# Patient Record
Sex: FEMALE | Race: WHITE | NOT HISPANIC OR LATINO
[De-identification: names, ages, dates, MRNs, and addresses within clinical notes are randomized per-mention and may not be internally consistent; named-entity substitution may affect disease eponyms.]

---

## 2017-05-08 ENCOUNTER — TRANSCRIPTION ENCOUNTER (OUTPATIENT)
Age: 22
End: 2017-05-08

## 2018-02-27 ENCOUNTER — OUTPATIENT (OUTPATIENT)
Dept: OUTPATIENT SERVICES | Facility: HOSPITAL | Age: 23
LOS: 1 days | Discharge: HOME | End: 2018-02-27

## 2018-02-27 DIAGNOSIS — R79.9 ABNORMAL FINDING OF BLOOD CHEMISTRY, UNSPECIFIED: ICD-10-CM

## 2018-03-10 ENCOUNTER — OUTPATIENT (OUTPATIENT)
Dept: OUTPATIENT SERVICES | Facility: HOSPITAL | Age: 23
LOS: 1 days | Discharge: HOME | End: 2018-03-10

## 2018-03-10 DIAGNOSIS — R10.10 UPPER ABDOMINAL PAIN, UNSPECIFIED: ICD-10-CM

## 2019-02-07 ENCOUNTER — TRANSCRIPTION ENCOUNTER (OUTPATIENT)
Age: 24
End: 2019-02-07

## 2019-04-03 ENCOUNTER — TRANSCRIPTION ENCOUNTER (OUTPATIENT)
Age: 24
End: 2019-04-03

## 2021-05-11 ENCOUNTER — TRANSCRIPTION ENCOUNTER (OUTPATIENT)
Age: 26
End: 2021-05-11

## 2021-06-18 ENCOUNTER — OUTPATIENT (OUTPATIENT)
Dept: OUTPATIENT SERVICES | Facility: HOSPITAL | Age: 26
LOS: 1 days | Discharge: HOME | End: 2021-06-18
Payer: COMMERCIAL

## 2021-06-18 DIAGNOSIS — E28.1 ANDROGEN EXCESS: ICD-10-CM

## 2021-06-18 PROCEDURE — 76775 US EXAM ABDO BACK WALL LIM: CPT | Mod: 26

## 2021-11-04 ENCOUNTER — OUTPATIENT (OUTPATIENT)
Dept: OUTPATIENT SERVICES | Facility: HOSPITAL | Age: 26
LOS: 1 days | Discharge: HOME | End: 2021-11-04
Payer: COMMERCIAL

## 2021-11-04 DIAGNOSIS — E28.1 ANDROGEN EXCESS: ICD-10-CM

## 2021-11-04 PROCEDURE — 74150 CT ABDOMEN W/O CONTRAST: CPT | Mod: 26

## 2022-03-21 ENCOUNTER — TRANSCRIPTION ENCOUNTER (OUTPATIENT)
Age: 27
End: 2022-03-21

## 2022-08-09 ENCOUNTER — NON-APPOINTMENT (OUTPATIENT)
Age: 27
End: 2022-08-09

## 2022-10-24 ENCOUNTER — NON-APPOINTMENT (OUTPATIENT)
Age: 27
End: 2022-10-24

## 2023-03-01 ENCOUNTER — NON-APPOINTMENT (OUTPATIENT)
Age: 28
End: 2023-03-01

## 2023-12-21 ENCOUNTER — NON-APPOINTMENT (OUTPATIENT)
Age: 28
End: 2023-12-21

## 2024-02-22 ENCOUNTER — NON-APPOINTMENT (OUTPATIENT)
Age: 29
End: 2024-02-22

## 2024-04-27 ENCOUNTER — NON-APPOINTMENT (OUTPATIENT)
Age: 29
End: 2024-04-27

## 2024-11-01 PROBLEM — Z00.00 ENCOUNTER FOR PREVENTIVE HEALTH EXAMINATION: Status: ACTIVE | Noted: 2024-11-01

## 2024-11-13 ENCOUNTER — APPOINTMENT (OUTPATIENT)
Dept: ORTHOPEDIC SURGERY | Facility: CLINIC | Age: 29
End: 2024-11-13

## 2024-11-13 ENCOUNTER — NON-APPOINTMENT (OUTPATIENT)
Age: 29
End: 2024-11-13

## 2024-11-13 DIAGNOSIS — M85.89 OTHER SPECIFIED DISORDERS OF BONE DENSITY AND STRUCTURE, MULTIPLE SITES: ICD-10-CM

## 2024-11-13 DIAGNOSIS — M23.91 UNSPECIFIED INTERNAL DERANGEMENT OF RIGHT KNEE: ICD-10-CM

## 2024-11-13 PROCEDURE — 99204 OFFICE O/P NEW MOD 45 MIN: CPT

## 2024-11-13 PROCEDURE — 73560 X-RAY EXAM OF KNEE 1 OR 2: CPT | Mod: RT

## 2024-11-13 RX ORDER — CHOLECALCIFEROL (VITAMIN D3) 50 MCG
50 MCG TABLET ORAL
Qty: 30 | Refills: 3 | Status: ACTIVE | COMMUNITY
Start: 2024-11-13 | End: 1900-01-01

## 2024-11-13 RX ORDER — ACETAMINOPHEN 500 MG/1
500 TABLET ORAL 3 TIMES DAILY
Qty: 120 | Refills: 0 | Status: ACTIVE | COMMUNITY
Start: 2024-11-13 | End: 1900-01-01

## 2024-11-13 RX ORDER — MELOXICAM 7.5 MG/1
7.5 TABLET ORAL
Qty: 30 | Refills: 2 | Status: ACTIVE | COMMUNITY
Start: 2024-11-13 | End: 1900-01-01

## 2024-12-06 ENCOUNTER — EMERGENCY (EMERGENCY)
Facility: HOSPITAL | Age: 29
LOS: 0 days | Discharge: HOME | End: 2024-12-06
Attending: EMERGENCY MEDICINE
Payer: COMMERCIAL

## 2024-12-06 VITALS
RESPIRATION RATE: 18 BRPM | OXYGEN SATURATION: 99 % | WEIGHT: 214.95 LBS | HEIGHT: 64 IN | SYSTOLIC BLOOD PRESSURE: 113 MMHG | TEMPERATURE: 98 F | HEART RATE: 61 BPM | DIASTOLIC BLOOD PRESSURE: 63 MMHG

## 2024-12-06 DIAGNOSIS — K62.89 OTHER SPECIFIED DISEASES OF ANUS AND RECTUM: ICD-10-CM

## 2024-12-06 DIAGNOSIS — K92.1 MELENA: ICD-10-CM

## 2024-12-06 PROCEDURE — 99283 EMERGENCY DEPT VISIT LOW MDM: CPT

## 2024-12-06 RX ORDER — HYDROCORTISONE BUTYRATE 0.1 %
1 CREAM (GRAM) TOPICAL
Qty: 1 | Refills: 0
Start: 2024-12-06 | End: 2024-12-10

## 2024-12-06 NOTE — ED PROVIDER NOTE - OBJECTIVE STATEMENT
29-year-old female no past medical history presents with rectal plain X 3 days.  Patient reports blood on toilet paper when wiping after bowel movement.  Denies abdominal pain, N/V/D.  Denies urinary symptoms.  Patient states she has been using Preparation H, warm baths, with no relief.  Denies current fever, chest pain, shortness of breath.

## 2024-12-06 NOTE — ED PROVIDER NOTE - NSFOLLOWUPINSTRUCTIONS_ED_ALL_ED_FT
Our Emergency Department Referral Coordinators will be reaching out to you in the next 24-48 hours from 9:00am to 5:00pm with a follow up appointment. Please expect a phone call from the hospital in that time frame. If you do not receive a call or if you have any questions or concerns, you can reach them at   [phone]     Follow-up with general surgery referral within 1 week for further evaluation of external hemorrhoid versus pilonidal cyst/abscess.  Return to emergency department for reevaluation if you develop worsening pain, fever, or any other concerning symptoms. Use Anusol cream to affected area twice per day as needed.    Hemorrhoids    Hemorrhoids are swollen veins that may form:  In the butt (rectum). These are called internal hemorrhoids.  Around the opening of the butt (anus). These are called external hemorrhoids.  Most hemorrhoids do not cause very bad problems. They often get better with changes to your lifestyle and what you eat.    What are the causes?  Having trouble pooping (constipation) or watery poop (diarrhea).  Pushing too hard when you poop.  Pregnancy.  Being very overweight (obese).  Sitting for too long.  Riding a bike for a long time.  Heavy lifting or other things that take a lot of effort.  Anal sex.  What are the signs or symptoms?  Pain.  Itching or soreness in the butt.  Bleeding from the butt.  Leaking poop.  Swelling.  One or more lumps around the opening of your butt.  How is this treated?  In most cases, hemorrhoids can be treated at home. You may be told to:  Change what you eat.  Make changes to your lifestyle.  If these treatments do not help, you may need to have a procedure done. Your doctor may need to:  Place rubber bands at the bottom of the hemorrhoids to make them fall off.  Put medicine into the hemorrhoids to shrink them.  Shine a type of light on the hemorrhoids to cause them to fall off.  Do surgery to get rid of the hemorrhoids.  Follow these instructions at home:  Medicines    Take over-the-counter and prescription medicines only as told by your doctor.  Use creams with medicine in them or medicines that you put in your butt as told by your doctor.  Eating and drinking    Examples of vegetables and other plant-based foods.  Eat foods that have a lot of fiber in them. These include whole grains, beans, nuts, fruits, and vegetables.  Ask your doctor about taking products that have fiber added to them (fibersupplements).  Take in less fat. You can do this by:  Eating low-fat dairy products.  Eating less red meat.  Staying away from processed foods.  Drink enough fluid to keep your pee (urine) pale yellow.  Managing pain and swelling    A bathtub partially filled with water.  Take a warm-water bath (sitz bath) for 20 minutes to ease pain. Do this 3–4 times a day. You may do this in a bathtub. You may also use a portable sitz bath that fits over the toilet.  If told, put ice on the painful area. It may help to use ice between your warm baths.  Put ice in a plastic bag.  Place a towel between your skin and the bag.  Leave the ice on for 20 minutes, 2–3 times a day.  If your skin turns bright red, take off the ice right away to prevent skin damage. The risk of damage is higher if you cannot feel pain, heat, or cold.  General instructions    Exercise. Ask your doctor how much and what kind of exercise is best for you.  Go to the bathroom when you need to poop. Do not wait.  Try not to push too hard when you poop.  Keep your butt dry and clean. Use wet toilet paper or moist towelettes after you poop.  Do not sit on the toilet for a long time.  Contact a doctor if:  You have pain and swelling that do not get better with treatment.  You have trouble pooping.  You cannot poop.  You have pain or swelling outside the area of the hemorrhoids.  Get help right away if:  You have bleeding from the butt that will not stop.  This information is not intended to replace advice given to you by your health care provider. Make sure you discuss any questions you have with your health care provider.    Pilonidal Cyst  Body outline from the back with a close-up of a pilonidal cyst.  A pilonidal cyst is a fluid-filled sac that forms under the skin near the tailbone, at the top of the crease of the buttocks (pilonidal area). Cysts that are small and not infected may not cause any problems.    Cysts that become irritated or infected may grow and fill with pus. An infected cyst is called an abscess. A pilonidal abscess may cause pain and swelling. It may need to be drained or removed.    What are the causes?  The cause of this condition is not always known. In some cases, it may be caused by a hair that grows into your skin (ingrown hair).    What increases the risk?  You are more likely to develop this condition if:  You are male.  You have lots of hair near the crease of the buttocks.  You are overweight.  You have a dimple near the crease of the buttocks.  You wear tight clothing.  You do not bathe or shower often.  You sit for long periods of time.  What are the signs or symptoms?  Symptoms of this condition may include pain, swelling, redness, and warmth in the pilonidal area. You may also be able to feel a lump near your tailbone if your cyst is big.    If your cyst becomes infected, symptoms may include:  Pus or fluid drainage.  Fever.  Pain, swelling, and redness getting worse.  The lump getting bigger.  How is this diagnosed?  This condition may be diagnosed based on:  Your symptoms and medical history.  A physical exam.  A blood test to check for infection.  A test of a pus sample.  How is this treated?  You may not need any treatment if your cyst does not cause symptoms. If your cyst bothers you or is infected, you may need a procedure to drain or remove the cyst. Depending on the size, location, and severity of your cyst, your health care provider may:  Make an incision in the cyst and drain it (incision anddrainage).  Open and drain the cyst, and then stitch the wound so that it stays open while it heals (marsupialization). You will be given instructions about how to care for your open wound while it heals.  Remove all or part of the cyst, and then close the wound (cyst removal).  You may need to take antibiotics before your procedure.    Follow these instructions at home:  Medicines    Take over-the-counter and prescription medicines only as told by your health care provider.  If you were prescribed antibiotics, take them as told by your health care provider. Do not stop taking the antibiotic even if you start to feel better.  General instructions    Keep the area around the cyst clean and dry.  If there is fluid or pus draining from your cyst:  Cover the area with a clean bandage (dressing).  Wash the area gently with soap and water. Pat the area dry with a clean towel. Do not rub the area because that may cause bleeding.  Remove hair from the area around the cyst only if your health care provider tells you to.  Do not wear tight pants or sit in one position for long periods at a time.  Contact a health care provider if:  You have new redness, swelling, or pain.  You have a fever.  You have severe pain.  Summary  A pilonidal cyst is a fluid-filled sac that forms under the skin near the tailbone, at the top of the crease of the buttocks (pilonidal area).  Cysts that become irritated or infected may grow and fill with pus. An infected cyst is called an abscess.  The cause of this condition is not always known. In some cases, it may be caused by a hair that grows into your skin (ingrown hair).  You may not need any treatment if your cyst does not cause symptoms. If your cyst bothers you or is infected, you may need a procedure to drain or remove the cyst.  This information is not intended to replace advice given to you by your health care provider. Make sure you discuss any questions you have with your health care provider.

## 2024-12-06 NOTE — ED PROVIDER NOTE - PATIENT PORTAL LINK FT
You can access the FollowMyHealth Patient Portal offered by [facility] by registering at the following website: [email or web address]. By joining [facility]’s FollowMyHealth portal, you will also be able to view your health information using other applications (apps) compatible with our system.

## 2024-12-06 NOTE — ED ADULT NURSE NOTE - NSSEPSISSUSPECTED_ED_A_ED
No
Treatment Goal Explanation (Does Not Render In The Note): Stable for the purposes of categorizing medical decision making is defined by the specific treatment goals for an individual patient. A patient that is not at their treatment goal is not stable, even if the condition has not changed and there is no short- term threat to life or function.

## 2024-12-06 NOTE — ED PROVIDER NOTE - PHYSICAL EXAMINATION
Vital Signs: I have reviewed the initial vital signs.  CONSTITUTIONAL: Pt in no acute distress laying on stretcher.  SKIN: Skin exam is warm and dry, no acute rash.  HEAD: Normocephalic; atraumatic.  EYES: PERRL, EOM intact; conjunctiva and sclera clear.  ENT: No nasal discharge; airway clear.   NECK: Supple; FROM  CARD: S1, S2 normal; no murmurs, gallops, or rubs. Regular rate and rhythm.  RESP: CTAB. No wheezes, rales or rhonchi.  ABD: soft; non-distended; non-tender; no hepatosplenomegaly.  Rectal: soft area of swelling and tenderness superior to anal sphincter, CUCO with brown stool, no blood or palpable mass. Chaperone Dr. Nelson  MSK: Normal ROM. No clubbing, cyanosis or edema.

## 2024-12-06 NOTE — ED PROVIDER NOTE - IV ALTEPLASE EXCL REL HIDDEN
Kelley Clairee discharged to home accompanied by family member.   Patient provided with the following educational materials upon discharge:  Hospice.   Valuables and belongings sent with patient.   discharge summary, discharge instructions, medications and follow up appointments reviewed with patient and family member.  Patient and family member verbalized understanding   show

## 2024-12-09 NOTE — CHART NOTE - NSCHARTNOTEFT_GEN_A_CORE
Patient's father communicated she is recovering well. Provided [facility] # if assistance is needed (general surgery referral) 12/9.

## 2025-01-06 ENCOUNTER — APPOINTMENT (OUTPATIENT)
Dept: ORTHOPEDIC SURGERY | Facility: CLINIC | Age: 30
End: 2025-01-06

## 2025-01-15 ENCOUNTER — TRANSCRIPTION ENCOUNTER (OUTPATIENT)
Age: 30
End: 2025-01-15

## 2025-01-15 ENCOUNTER — APPOINTMENT (OUTPATIENT)
Dept: ENDOCRINOLOGY | Facility: CLINIC | Age: 30
End: 2025-01-15
Payer: COMMERCIAL

## 2025-01-15 VITALS
OXYGEN SATURATION: 97 % | SYSTOLIC BLOOD PRESSURE: 120 MMHG | HEIGHT: 64 IN | BODY MASS INDEX: 38.24 KG/M2 | WEIGHT: 224 LBS | HEART RATE: 68 BPM | DIASTOLIC BLOOD PRESSURE: 70 MMHG

## 2025-01-15 DIAGNOSIS — E28.2 POLYCYSTIC OVARIAN SYNDROME: ICD-10-CM

## 2025-01-15 DIAGNOSIS — L65.9 NONSCARRING HAIR LOSS, UNSPECIFIED: ICD-10-CM

## 2025-01-15 DIAGNOSIS — E66.9 OBESITY, UNSPECIFIED: ICD-10-CM

## 2025-01-15 PROBLEM — Z78.9 OTHER SPECIFIED HEALTH STATUS: Chronic | Status: ACTIVE | Noted: 2024-12-06

## 2025-01-15 PROCEDURE — 99203 OFFICE O/P NEW LOW 30 MIN: CPT

## 2025-01-15 RX ORDER — METFORMIN HYDROCHLORIDE 500 MG/1
500 TABLET, COATED ORAL
Qty: 60 | Refills: 5 | Status: ACTIVE | COMMUNITY
Start: 2025-01-15 | End: 1900-01-01

## 2025-01-15 RX ORDER — SPIRONOLACTONE 100 MG/1
100 TABLET ORAL
Refills: 0 | Status: ACTIVE | COMMUNITY

## 2025-02-03 ENCOUNTER — NON-APPOINTMENT (OUTPATIENT)
Age: 30
End: 2025-02-03

## 2025-02-03 ENCOUNTER — LABORATORY RESULT (OUTPATIENT)
Age: 30
End: 2025-02-03

## 2025-02-03 ENCOUNTER — APPOINTMENT (OUTPATIENT)
Dept: OBGYN | Facility: CLINIC | Age: 30
End: 2025-02-03
Payer: COMMERCIAL

## 2025-02-03 VITALS — HEIGHT: 64 IN | BODY MASS INDEX: 38.41 KG/M2 | WEIGHT: 225 LBS

## 2025-02-03 DIAGNOSIS — Z01.411 ENCOUNTER FOR GYNECOLOGICAL EXAMINATION (GENERAL) (ROUTINE) WITH ABNORMAL FINDINGS: ICD-10-CM

## 2025-02-03 DIAGNOSIS — R79.89 OTHER SPECIFIED ABNORMAL FINDINGS OF BLOOD CHEMISTRY: ICD-10-CM

## 2025-02-03 PROCEDURE — 99385 PREV VISIT NEW AGE 18-39: CPT

## 2025-02-03 PROCEDURE — 99459 PELVIC EXAMINATION: CPT

## 2025-02-03 RX ORDER — DROSPIRENONE AND ETHINYL ESTRADIOL 0.02-3(28)
3-0.02 KIT ORAL DAILY
Qty: 3 | Refills: 3 | Status: ACTIVE | COMMUNITY
Start: 2025-02-03 | End: 1900-01-01

## 2025-02-11 PROBLEM — R79.89 ELEVATED TESTOSTERONE LEVEL IN FEMALE: Status: ACTIVE | Noted: 2025-02-11

## 2025-05-16 ENCOUNTER — OUTPATIENT (OUTPATIENT)
Dept: OUTPATIENT SERVICES | Facility: HOSPITAL | Age: 30
LOS: 1 days | End: 2025-05-16

## 2025-05-16 ENCOUNTER — APPOINTMENT (OUTPATIENT)
Dept: INTERNAL MEDICINE | Facility: CLINIC | Age: 30
End: 2025-05-16

## 2025-05-16 DIAGNOSIS — E66.09 OTHER OBESITY DUE TO EXCESS CALORIES: ICD-10-CM

## 2025-05-20 ENCOUNTER — APPOINTMENT (OUTPATIENT)
Dept: OBGYN | Facility: CLINIC | Age: 30
End: 2025-05-20

## 2025-05-29 ENCOUNTER — NON-APPOINTMENT (OUTPATIENT)
Age: 30
End: 2025-05-29

## 2025-05-30 ENCOUNTER — APPOINTMENT (OUTPATIENT)
Dept: SURGERY | Facility: CLINIC | Age: 30
End: 2025-05-30

## 2025-05-30 VITALS
HEIGHT: 63.5 IN | TEMPERATURE: 97 F | HEART RATE: 83 BPM | BODY MASS INDEX: 40.77 KG/M2 | WEIGHT: 233 LBS | DIASTOLIC BLOOD PRESSURE: 78 MMHG | OXYGEN SATURATION: 98 % | SYSTOLIC BLOOD PRESSURE: 122 MMHG

## 2025-05-30 DIAGNOSIS — E88.819 INSULIN RESISTANCE, UNSPECIFIED: ICD-10-CM

## 2025-05-30 DIAGNOSIS — E66.813 OBESITY, CLASS 3: ICD-10-CM

## 2025-05-30 PROCEDURE — 99205 OFFICE O/P NEW HI 60 MIN: CPT

## 2025-05-30 PROCEDURE — G2211 COMPLEX E/M VISIT ADD ON: CPT

## 2025-05-30 RX ORDER — SEMAGLUTIDE 0.25 MG/.5ML
0.25 INJECTION, SOLUTION SUBCUTANEOUS
Qty: 1 | Refills: 0 | Status: ACTIVE | COMMUNITY
Start: 2025-05-30 | End: 1900-01-01

## 2025-05-30 RX ORDER — SEMAGLUTIDE 0.5 MG/.5ML
0.5 INJECTION, SOLUTION SUBCUTANEOUS
Qty: 1 | Refills: 2 | Status: ACTIVE | COMMUNITY
Start: 2025-05-30 | End: 1900-01-01

## 2025-05-30 RX ORDER — METFORMIN HYDROCHLORIDE 750 MG/1
750 TABLET ORAL
Qty: 60 | Refills: 1 | Status: ACTIVE | COMMUNITY
Start: 2025-05-30 | End: 1900-01-01

## 2025-07-17 ENCOUNTER — APPOINTMENT (OUTPATIENT)
Dept: SURGERY | Facility: CLINIC | Age: 30
End: 2025-07-17

## 2025-08-02 PROBLEM — R63.5 WEIGHT GAIN DUE TO MEDICATION: Status: ACTIVE | Noted: 2025-08-02

## 2025-08-02 PROBLEM — Z83.49 FAMILY HISTORY OF OBESITY: Status: ACTIVE | Noted: 2025-08-02

## 2025-08-02 PROBLEM — E78.5 DYSLIPIDEMIA: Status: ACTIVE | Noted: 2025-08-02

## 2025-08-02 PROBLEM — Z80.0 FAMILY HISTORY OF MALIGNANT NEOPLASM OF COLON: Status: ACTIVE | Noted: 2025-08-02

## 2025-08-02 PROBLEM — E78.6 LOW HDL (UNDER 40): Status: ACTIVE | Noted: 2025-08-02

## 2025-08-02 PROBLEM — E88.810 METABOLIC SYNDROME: Status: ACTIVE | Noted: 2025-08-02

## 2025-08-20 ENCOUNTER — APPOINTMENT (OUTPATIENT)
Dept: SURGERY | Facility: CLINIC | Age: 30
End: 2025-08-20